# Patient Record
Sex: MALE
[De-identification: names, ages, dates, MRNs, and addresses within clinical notes are randomized per-mention and may not be internally consistent; named-entity substitution may affect disease eponyms.]

---

## 2021-03-27 ENCOUNTER — HOSPITAL ENCOUNTER (EMERGENCY)
Dept: HOSPITAL 97 - ER | Age: 31
Discharge: HOME | End: 2021-03-27
Payer: SELF-PAY

## 2021-03-27 VITALS — SYSTOLIC BLOOD PRESSURE: 136 MMHG | DIASTOLIC BLOOD PRESSURE: 85 MMHG | OXYGEN SATURATION: 98 %
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
## 2021-03-27 DIAGNOSIS — Z23: ICD-10-CM

## 2021-03-27 DIAGNOSIS — S51.811A: Primary | ICD-10-CM

## 2021-03-27 PROCEDURE — 90471 IMMUNIZATION ADMIN: CPT

## 2021-03-27 PROCEDURE — 99284 EMERGENCY DEPT VISIT MOD MDM: CPT

## 2021-03-27 PROCEDURE — 90714 TD VACC NO PRESV 7 YRS+ IM: CPT

## 2021-03-27 PROCEDURE — 0JQG0ZZ REPAIR RIGHT LOWER ARM SUBCUTANEOUS TISSUE AND FASCIA, OPEN APPROACH: ICD-10-PCS

## 2021-03-27 NOTE — EDPHYS
Physician Documentation                                                                           

 [facility]                                                                 

Name: Kamron Villagomez                                                                                 

Age: 30 yrs                                                                                       

Sex: Male                                                                                         

: 1990                                                                                   

MRN: [id number]                                                                                   

Arrival Date: 2021                                                                          

Time: 01:10                                                                                       

Account#: [id number]                                                                            

Bed 3                                                                                             

Private MD:                                                                                       

ED Physician Maite Brady                                                                    

HPI:                                                                                              

                                                                                             

01:34 This 30 yrs old  Male presents to ER via Wheelchair with complaints of          ma2 

      Laceration To Forearm.                                                                      

01:34 The patient or guardian complains of a laceration. Onset: The symptoms/episode          ma2 

      began/occurred suddenly, 1 hour(s) ago. Associated signs and symptoms: Pertinent            

      negatives: erythema, numbness, pain, swelling, tingling, vomiting, warmth, weakness.        

      Severity of symptoms: At their worst the symptoms were moderate, in the emergency           

      department the symptoms are unchanged. The patient has not experienced similar symptoms     

      in the past.                                                                                

                                                                                                  

Historical:                                                                                       

- Home Meds:                                                                                      

01:23 None [Active];                                                                          mg2 

- PMHx:                                                                                           

:23 None;                                                                                   mg2 

- PSHx:                                                                                           

:23 None;                                                                                   mg2 

                                                                                                  

- Immunization history:: Last tetanus immunization: Flu vaccine status is unknown.                

- Social history:: Patient/guardian denies using alcohol, street drugs, The patient               

  lives with family, Smoking status: unknown.                                                     

- Family history:: not pertinent.                                                                 

                                                                                                  

                                                                                                  

ROS:                                                                                              

01:34 Constitutional: Negative for fever, chills, and weight loss.                            ma2 

01:34 All other systems are negative.                                                             

                                                                                                  

Exam:                                                                                             

01:34 Constitutional:  This is a well developed, well nourished patient who is awake, alert,  ma2 

      and in no acute distress. Head/Face:  Normocephalic, atraumatic. Eyes:  Pupils equal        

      round and reactive to light, extra-ocular motions intact.  Lids and lashes normal.          

      Conjunctiva and sclera are non-icteric and not injected.  Cornea within normal limits.      

      Periorbital areas with no swelling, redness, or edema. ENT:  Nares patent. No nasal         

      discharge, no septal abnormalities noted.  Tympanic membranes are normal and external       

      auditory canals are clear.  Oropharynx with no redness, swelling, or masses, exudates,      

      or evidence of obstruction, uvula midline.  Mucous membranes moist. Neck:  Trachea          

      midline, no thyromegaly or masses palpated, and no cervical lymphadenopathy.  Supple,       

      full range of motion without nuchal rigidity, or vertebral point tenderness.  No            

      Meningismus. Chest/axilla:  Normal chest wall appearance and motion.  Nontender with no     

      deformity.  No lesions are appreciated. Cardiovascular:  Regular rate and rhythm with a     

      normal S1 and S2.  No gallops, murmurs, or rubs.  Normal PMI, no JVD.  No pulse             

      deficits. Respiratory:  Lungs have equal breath sounds bilaterally, clear to                

      auscultation and percussion.  No rales, rhonchi or wheezes noted.  No increased work of     

      breathing, no retractions or nasal flaring. Abdomen/GI:  Soft, non-tender, with normal      

      bowel sounds.  No distension or tympany.  No guarding or rebound.  No evidence of           

      tenderness throughout. Skin:  Warm, dry with normal turgor.  Normal color with no           

      rashes, no lesions, and no evidence of cellulitis. MS/ Extremity:  right forearm 5 cm       

      linear laceration, bleeding is controlled, uper exmining the wound ithere is a              

      lacerated nerve, all hand movement and wrist movement is intact. Pulses equal, no           

      cyanosis.  Neurovascular intact.  Full, normal range of motion. Neuro:  Awake and           

      alert, GCS 15, oriented to person, place, time, and situation.  Cranial nerves II-XII       

      grossly intact.  Motor strength 5/5 in all extremities.  Sensory grossly intact.            

      Cerebellar exam normal.  Normal gait.                                                       

                                                                                                  

Vital Signs:                                                                                      

01:20  / 85; Pulse 94; Resp 18; Pulse Ox 98% on R/A;                                    mg2 

                                                                                                  

Erick Coma Score:                                                                               

01:46 Eye Response: spontaneous(4). Verbal Response: oriented(5). Motor Response: obeys       rv  

      commands(6). Total: 15.                                                                     

                                                                                                  

Trauma Score (Adult):                                                                             

01:46 Eye Response: spontaneous(1); Verbal Response: oriented(1); Motor Response: obeys       rv  

      commands(2); Systolic BP: > 89 mm Hg(4); Respiratory Rate: 10 to 29 per min(4); Erick     

      Score: 15; Trauma Score: 12                                                                 

                                                                                                  

Laceration:                                                                                       

01:34 Wound Repair of 4cm ( 1.6in ) subcutaneous laceration to right arm. Linear shaped..     ma2 

      Distal neuro/vascular/tendon intact. Anesthesia: Local anesthetic administered with 10      

      mls of 1% lidocaine w/ Epi. Wound prep: Extensive cleansing, Wound irrigation. Skin         

      closed with 11 1-0 Staples using simple sutures and sterile technique. Dressed with         

      4x4's. Patient tolerated well.                                                              

                                                                                                  

MDM:                                                                                              

01:33 Patient medically screened.                                                             ma2 

01:34 Differential diagnosis: contusion, abrasion, tendonitis, laceration. Data reviewed:     ma2 

      vital signs, nurses notes. Counseling: I had a detailed discussion with the patient         

      and/or guardian regarding: the historical points, exam findings, and any diagnostic         

      results supporting the discharge/admit diagnosis, the presence of at least one elevated     

      blood pressure reading (>120/80) during this emergency department visit, the need for       

      outpatient follow up. Response to treatment: the patient's symptoms have markedly           

      improved after treatment. ED course: patient has right forearm laceration that includes     

      a nerve laceration, although hand movement is intact he may lose muscle strength such       

      as strength of wrist anterior flexion ulnar adduction, vs could be sensation loss, this     

      was not properly assessed as there is pain. he will benefit from hand surgeon, however      

      patient states that he does not anything other than closing the laceration as his hand      

      movement is intact and that all what he cares about. i explained that he may have           

      sensation loss or certain hand movement weakness. he declined any further care, he also     

      declined xray for foreign body evaluation .                                                 

                                                                                                  

Administered Medications:                                                                         

01:37 Drug: Tetanus-Diphtheria Toxoid Adult 0.5 ml {: Retora Black. Exp:         rr5 

      2022. Lot #: [id number]. } Route: IM; Site: right deltoid;                                 

01:51 Follow up: Response: No adverse reaction                                                rv  

                                                                                                  

                                                                                                  

Disposition:                                                                                      

21 01:43 Discharged to Home. Impression: Laceration without foreign body of right           

  forearm.                                                                                        

- Condition is Stable.                                                                            

- Discharge Instructions: Laceration Care, Adult, Easy-to-Read.                                   

                                                                                                  

- Medication Reconciliation Form, Thank You Letter, Antibiotic Education, Prescription            

  Opioid Use form.                                                                                

- Follow up: Private Physician; When: Tomorrow; Reason: If symptoms return.                       

- Notes: remove staples in 2 weeks                                                                

                                                                                                  

                                                                                                  

Signatures:                                                                                       

Maite Brady MD MD   ma2                                                  

Esau Esqueda, PAULA                    RN   mg2                                                  

Kam Christie RN                      RN   rr5                                                  

Silviano Sinclair RN   rv                                                   

                                                                                                  

Corrections: (The following items were deleted from the chart)                                    

01:54 01:43 2021 01:43 Discharged to Home. Impression: Laceration without foreign body  mg2 

      of right forearm. Condition is Stable. Forms are Medication Reconciliation Form, Thank      

      You Letter, Antibiotic Education, Prescription Opioid Use. Follow up: Private               

      Physician; When: Tomorrow; Reason: If symptoms return. ma2                                  

                                                                                                  

**************************************************************************************************
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
## 2023-02-13 ENCOUNTER — HOSPITAL ENCOUNTER (EMERGENCY)
Dept: HOSPITAL 97 - ER | Age: 33
Discharge: HOME | End: 2023-02-13
Payer: COMMERCIAL

## 2023-02-13 VITALS — DIASTOLIC BLOOD PRESSURE: 92 MMHG | OXYGEN SATURATION: 99 % | SYSTOLIC BLOOD PRESSURE: 145 MMHG

## 2023-02-13 VITALS — TEMPERATURE: 98.6 F

## 2023-02-13 DIAGNOSIS — Z88.8: ICD-10-CM

## 2023-02-13 DIAGNOSIS — Z88.6: ICD-10-CM

## 2023-02-13 DIAGNOSIS — S29.012A: ICD-10-CM

## 2023-02-13 DIAGNOSIS — S16.1XXA: Primary | ICD-10-CM

## 2023-02-13 PROCEDURE — 71046 X-RAY EXAM CHEST 2 VIEWS: CPT

## 2023-02-13 PROCEDURE — 72040 X-RAY EXAM NECK SPINE 2-3 VW: CPT

## 2023-02-13 PROCEDURE — 96372 THER/PROPH/DIAG INJ SC/IM: CPT

## 2023-02-13 PROCEDURE — 99283 EMERGENCY DEPT VISIT LOW MDM: CPT

## 2023-02-13 PROCEDURE — 93005 ELECTROCARDIOGRAM TRACING: CPT

## 2023-02-13 NOTE — RAD REPORT
EXAM DESCRIPTION:  RAD - C Spine Ap/Lat - 2/13/2023 8:44 pm

 

CLINICAL HISTORY:  Pain

 

COMPARISON:  None.

 

FINDINGS:  Cervical vertebra bodies are normal in height and alignment. No fracture or acute bony pro
cess seen. No disc space narrowing.

 

There is no prevertebral soft tissue thickening or other suspicious soft tissue finding.

 

 

IMPRESSION:  Normal cervical spine radiographs.

## 2023-02-13 NOTE — RAD REPORT
[facility] DES[facility]  [facility] 8:44 pm

 

CLINICAL HISTORY:  CHEST PAIN

 

COMPARISON:  No comparisons

 

TECHNIQUE:   PA and lateral views of the chest.

 

FINDINGS:  The lungs are clear. No pneumothorax or effusion. The cardiomediastinal contours are unrem
arkable.

 

IMPRESSION:  No acute cardiopulmonary process.

## 2023-02-13 NOTE — EDPHYS
Physician Documentation                                                                           

 [facility]                                                                 

Name: Kamron Villagomez                                                                                 

Age: 32 yrs                                                                                       

Sex: Male                                                                                         

: 1990                                                                                   

MRN: [id number]                                                                                   

Arrival Date: 2023                                                                          

Time: 18:30                                                                                       

Account#: [id number]                                                                            

Bed 11                                                                                            

Private MD:                                                                                       

ED Physician Tomi Capone                                                                      

HPI:                                                                                              

                                                                                             

22:07 This 32 yrs old  Male presents to ER via Ambulatory with complaints of Neck and minerva 

      Upper Back Pain.                                                                            

22:07 The patient or guardian complains of decreased range of motion, pain, that is acute.    minerva 

      The symptoms are located on the posterior cervical area, left trapezius, right              

      trapezius and thoracic area. Onset: The symptoms/episode began/occurred 2 day(s) ago.       

      Context: The problem was sustained at home, The neck injury/problem resulted from           

      lifting. Associated signs and symptoms: Pertinent positives:. The pain does not             

      radiate. Severity of symptoms: At their worst the symptoms were mild, moderate, in the      

      emergency department the symptoms are unchanged. The patient has not experienced            

      similar symptoms in the past.                                                               

                                                                                                  

Historical:                                                                                       

- Allergies:                                                                                      

19:32 Benadryl;                                                                               jh5 

19:32 Tylenol;                                                                                jh5 

                                                                                                  

- Immunization history:: Adult Immunizations up to date.                                          

- Social history:: Smoking status: Patient denies any tobacco usage or history of.                

- Family history:: not pertinent.                                                                 

                                                                                                  

                                                                                                  

ROS:                                                                                              

22:07 Constitutional: Negative for fever, chills, and weight loss, Eyes: Negative for injury, minerva 

      pain, redness, and discharge, ENT: Negative for injury, pain, and discharge, Neck:          

      Negative for injury, pain, and swelling, Cardiovascular: Negative for chest pain,           

      palpitations, and edema, Respiratory: Negative for shortness of breath, cough,              

      wheezing, and pleuritic chest pain, Abdomen/GI: Negative for abdominal pain, nausea,        

      vomiting, diarrhea, and constipation, Back: Negative for injury and pain, : Negative      

      for injury, bleeding, discharge, and swelling, Skin: Negative for injury, rash, and         

      discoloration, Neuro: Negative for headache, weakness, numbness, tingling, and seizure,     

      Psych: Negative for depression, anxiety, suicide ideation, homicidal ideation, and          

      hallucinations, Allergy/Immunology: Negative for hives, rash, and allergies, Endocrine:     

      Negative for neck swelling, polydipsia, polyuria, polyphagia, and marked weight changes.    

22:07 MS/extremity: Positive for decreased range of motion, pain, swelling, tenderness, of        

      the back.                                                                                   

                                                                                                  

Exam:                                                                                             

22:07 Constitutional:  This is a well developed, well nourished patient who is awake, alert,  minerva 

      and in no acute distress. Head/Face:  Normocephalic, atraumatic. Eyes:  Pupils equal        

      round and reactive to light, extra-ocular motions intact.  Lids and lashes normal.          

      Conjunctiva and sclera are non-icteric and not injected.  Cornea within normal limits.      

      Periorbital areas with no swelling, redness, or edema. ENT:  Nares patent. No nasal         

      discharge, no septal abnormalities noted.  Tympanic membranes are normal and external       

      auditory canals are clear.  Oropharynx with no redness, swelling, or masses, exudates,      

      or evidence of obstruction, uvula midline.  Mucous membranes moist. Neck:  Trachea          

      midline, no thyromegaly or masses palpated, and no cervical lymphadenopathy.  Supple,       

      full range of motion without nuchal rigidity, or vertebral point tenderness.  No            

      Meningismus. Chest/axilla:  Normal chest wall appearance and motion.  Nontender with no     

      deformity.  No lesions are appreciated. Cardiovascular:  Regular rate and rhythm with a     

      normal S1 and S2.  No gallops, murmurs, or rubs.  Normal PMI, no JVD.  No pulse             

      deficits. Respiratory:  Lungs have equal breath sounds bilaterally, clear to                

      auscultation and percussion.  No rales, rhonchi or wheezes noted.  No increased work of     

      breathing, no retractions or nasal flaring. Abdomen/GI:  Soft, non-tender, with normal      

      bowel sounds.  No distension or tympany.  No guarding or rebound.  No evidence of           

      tenderness throughout. Back:  No spinal tenderness.  No costovertebral tenderness.          

      Full range of motion. Male :  Normal genitalia with no discharge or lesions. Skin:        

      Warm, dry with normal turgor.  Normal color with no rashes, no lesions, and no evidence     

      of cellulitis. MS/ Extremity:  Pulses equal, no cyanosis.  Neurovascular intact.  Full,     

      normal range of motion. Neuro:  Awake and alert, GCS 15, oriented to person, place,         

      time, and situation.  Cranial nerves II-XII grossly intact.  Motor strength 5/5 in all      

      extremities.  Sensory grossly intact.  Cerebellar exam normal.  Normal gait. Psych:         

      Awake, alert, with orientation to person, place and time.  Behavior, mood, and affect       

      are within normal limits.                                                                   

22:36 ECG was reviewed by the Attending Physician.                                            [facility] 

                                                                                                  

Vital Signs:                                                                                      

19:30  / 89; Pulse 84; Resp 18; Temp 98.6; Pulse Ox 100% ; Weight 102.06 kg; Height 6   [facility] 

      ft. 0 in. (182.88 cm); Pain 8/10;                                                           

22:22  / 92; Pulse 78; Resp 16; Pulse Ox 99% ;                                          mb9 

19:30 Body Mass Index 30.52 (102.06 kg, 182.88 cm)                                            [facility] 

                                                                                                  

MDM:                                                                                              

19:34 Patient medically screened.                                                             minerva 

22:11 Differential diagnosis: arthritis, Cervical Disc Herniation Cervical Raiculopathy       minerva 

      cervical strain, Degenerative Disc Disease Neck Contusion Osteoarthritis Spinal Cord        

      Compression Spondylolisthesis torticollis. Data reviewed: vital signs, nurses notes,        

      lab test result(s), EKG, radiologic studies, plain films. Consideration of                  

      Admission/Observation Escalation of care including admission/observation considered.        

      Test considered but Not performed: CT: no ct c spine no ct chest. Historians other than     

      the Patient: Spouse/Significant Other: wife. Care significantly affected by the             

      following chronic conditions: none.                                                         

                                                                                                  

                                                                                             

19:38 Order name: Chest Pa And Lat (2 Views) XRAY                                             [facility] 

                                                                                             

19:38 Order name: C Spine Ap/Lat XRAY                                                         [facility] 

                                                                                             

21:06 Order name: RAD; Complete Time: 21:30                                                   [facility]

                                                                                             

21:06 Order name: RAD; Complete Time: 21:30                                                   [facility]

                                                                                             

19:47 Order name: EKG; Complete Time: 19:47                                                   [facility] 

                                                                                             

22:21 Order name: EKG; Complete Time: 22:21                                                   mb9 

                                                                                                  

EC:36 Rate is 54 beats/min. Rhythm is regular. QRS Axis is Normal. VA interval is normal. QRS minerva 

      interval is normal. QT interval is normal. No Q waves. T waves are Normal. Clinical         

      impression: Sinus bradycardia. Interpreted by me. Reviewed by me.                           

                                                                                                  

Administered Medications:                                                                         

20:31 Drug: TORadol (ketorolac) 60 mg Route: IM; Site: right vastus lateralis;                kl  

22:07 CANCELLED (Duplicate Order): Norco (HYDROcodone-acetaminophen) 10 mg-325 mg 1 tabs PO   minerva 

      once                                                                                        

22:21 Drug: Valium (diazepam) 10 mg Route: PO;                                                mb9 

22:25 Follow up: Response: No adverse reaction                                                mb9 

                                                                                                  

                                                                                                  

Disposition Summary:                                                                              

23 22:10                                                                                    

Discharge Ordered                                                                                 

      Location: Home                                                                          [facility] 

      Problem: new                                                                            minerva 

      Symptoms: have improved                                                                 minerva 

      Condition: Stable                                                                       minerva 

      Diagnosis                                                                                   

        - Strain of muscle and tendon of unspecified wall of thorax                           minerva 

        - Strain of muscle, fascia and tendon at neck level, initial encounter                minerva 

      Followup:                                                                               minerva 

        - With: Private Physician                                                                  

        - When: 2 - 3 days                                                                         

        - Reason: Recheck today's complaints, Continuance of care, Re-evaluation by your           

      physician                                                                                   

      Discharge Instructions:                                                                     

        - Discharge Summary Sheet                                                             minerva 

        - Muscle Strain                                                                       mienrva 

        - Neck Contusion                                                                      minerva 

        - Muscle Strain, Easy-to-Read                                                         minerva 

        - Neck Contusion, Easy-to-Read                                                        minerva 

      Forms:                                                                                      

        - Medication Reconciliation Form                                                      [facility] 

        - Thank You Letter                                                                    minerva 

        - Antibiotic Education                                                                minerva 

        - Prescription Opioid Use                                                             [facility] 

        - Work release form                                                                   mb9 

      Prescriptions:                                                                              

        - Ibuprofen 600 mg Oral Tablet                                                             

            - take 1 tablet by ORAL route every 6 hours As needed take with food; 30 tablet;  [facility] 

      Refills: 0, Product Selection Permitted                                                     

        - Medrol (Shankar) 4 mg Oral Tablets, Dose Pack                                                

            - take 1 tablet by ORAL route as directed - follow package instructions; 1        minerva 

      packet; Refills: 0, Product Selection Permitted                                             

        - Cyclobenzaprine 5 mg Oral Tablet                                                         

            - take 1 tablet by ORAL route 3 times per day As needed; 15 tablet; Refills: 0,   [facility] 

      Product Selection Permitted                                                                 

Signatures:                                                                                       

Dispatcher [employer]                           Lisette Castillo, RN                     Tomi Jett MD MD cha Rees, Jessica RN                       RN   jh5                                                  

Ariane Castellanos RN                 RN   mb9                                                  

                                                                                                  

Corrections: (The following items were deleted from the chart)                                    

22:07 22:07 Norco (HYDROcodone-acetaminophen) 10 mg-325 mg 1 tabs PO once ordered. [facility] 

22:17 19:47 EKG - Nurse/Tech ordered. [facility] 

                                                                                                  

**************************************************************************************************

## 2023-02-13 NOTE — ER
Nurse's Notes                                                                                     

 Formerly [facility]                                                                 

Name: Kamron Villagomez                                                                                 

Age: 32 yrs                                                                                       

Sex: Male                                                                                         

: 1990                                                                                   

MRN: [id number]                                                                                   

Arrival Date: 2023                                                                          

Time: 18:30                                                                                       

Account#: [id number]                                                                            

Bed 11                                                                                            

Private MD:                                                                                       

Diagnosis: Strain of muscle and tendon of unspecified wall of thorax;Strain of muscle, fascia and 

  tendon at neck level, initial encounter                                                         

                                                                                                  

Presentation:                                                                                     

                                                                                             

19:30 Chief complaint: Patient states: i think i pulled something in my upper back; I was at  [facility] 

      the gym doing  press yesterday. Coronavirus screen: Vaccine status: Patient         

      reports receiving the 2nd dose of the covid vaccine. Client denies travel out of the        

      [place] in the last 14 days. Ebola Screen: Patient negative for fever greater than or          

      equal to 101.5 degrees Fahrenheit, and additional compatible Ebola Virus Disease            

      symptoms Patient denies exposure to infectious person. Patient denies travel to an          

      Ebola-affected area in the 21 days before illness onset. Initial Sepsis Screen: Does        

      the patient meet any 2 criteria? No. Patient's initial sepsis screen is negative. Does      

      the patient have a suspected source of infection? No. Patient's initial sepsis screen       

      is negative. Risk Assessment: Do you want to hurt yourself or someone else? Patient         

      reports no desire to harm self or others.                                                   

19:30 Method Of Arrival: Ambulatory                                                           [facility] 

19:30 Acuity: SUE 3                                                                           [facility] 

                                                                                                  

Triage Assessment:                                                                                

19:32 General: Appears uncomfortable, slender, well groomed, well developed, well nourished,  [facility] 

      Behavior is calm, cooperative, appropriate for age. Pain: Complains of pain in upper        

      back.                                                                                       

                                                                                                  

Historical:                                                                                       

- Allergies:                                                                                      

19:32 Benadryl;                                                                               5 

19:32 Tylenol;                                                                                [facility] 

                                                                                                  

- Immunization history:: Adult Immunizations up to date.                                          

- Social history:: Smoking status: Patient denies any tobacco usage or history of.                

- Family history:: not pertinent.                                                                 

                                                                                                  

                                                                                                  

Screenin:00 [facility] ED Fall Risk Assessment (Adult) History of falling in the last 3 months,       mb9 

      including since admission No falls in past 3 months (0 pts) Confusion or Disorientation     

      No (0 pts) Intoxicated or Sedated No (0 pts) Impaired Gait No (0 pts) Mobility Assist       

      Device Used No (0 pt) Altered Elimination No (0 pt) Score/Fall Risk Level 0 - 2 = Low       

      Risk Oriented to surroundings, Maintained a safe environment, Educated pt \T\ family on     

      fall prevention, incl call for assistance when getting out of bed. Abuse screen: Denies     

      threats or abuse. Nutritional screening: No deficits noted. Tuberculosis screening: No      

      symptoms or risk factors identified.                                                        

                                                                                                  

Assessment:                                                                                       

22:20 Reassessment: No changes from previously documented assessment. Patient and/or family   mb9 

      updated on plan of care and expected duration. Pain level reassessed. Patient is alert,     

      oriented x 3, equal unlabored respirations, skin warm/dry/pink. Patient states feeling      

      better. Patient states symptoms have improved.                                              

                                                                                                  

Vital Signs:                                                                                      

19:30  / 89; Pulse 84; Resp 18; Temp 98.6; Pulse Ox 100% ; Weight 102.06 kg; Height 6   5 

      ft. 0 in. (182.88 cm); Pain 8/10;                                                           

22:22  / 92; Pulse 78; Resp 16; Pulse Ox 99% ;                                          mb9 

19:30 Body Mass Index 30.52 (102.06 kg, 182.88 cm)                                            [facility] 

                                                                                                  

ED Course:                                                                                        

18:30 Patient arrived in ED.                                                                  as  

19:32 Triage completed.                                                                       [facility] 

19:32 Arm band placed on right wrist.                                                         [facility] 

19:34 Tomi Capone MD is Attending Physician.                                             [facility] 

19:45 Placed in gown. Bed in low position. Call light in reach. Side rails up X 1. Client     mb9 

      placed on continuous cardiac and pulse oximetry monitoring. NIBP monitoring applied.        

22:22 No provider procedures requiring assistance completed. EKG done, by EKG tech. reviewed  mb9 

      by Tomi Capone MD. Patient did not have IV access during this emergency room visit.      

                                                                                                  

Administered Medications:                                                                         

20:31 Drug: TORadol (ketorolac) 60 mg Route: IM; Site: right vastus lateralis;                kl  

22:07 CANCELLED (Duplicate Order): Norco (HYDROcodone-acetaminophen) 10 mg-325 mg 1 tabs PO   [facility] 

      once                                                                                        

22:21 Drug: Valium (diazepam) 10 mg Route: PO;                                                mb9 

22:25 Follow up: Response: No adverse reaction                                                mb9 

                                                                                                  

                                                                                                  

Medication:                                                                                       

22:22 VIS not applicable for this client.                                                     mb9 

                                                                                                  

Outcome:                                                                                          

22:10 Discharge ordered by MD.                                                                [facility] 

22:23 Discharged to home ambulatory.                                                          mb9 

22:23 Condition: stable                                                                           

22:23 Discharge instructions given to patient, Instructed on discharge instructions, follow       

      up and referral plans. Demonstrated understanding of instructions, follow-up care,          

      medications, Prescriptions given X 3.                                                       

22:25 Patient left the ED.                                                                    mb9 

22:37 Patient left the ED.                                                                    mb9 

                                                                                                  

Signatures:                                                                                       

Lisette Davila, RN                     Tomi Jett MD MD cha Martinez, Amelia as Rees, Jessica, RN                       RN   jh5                                                  

Ariane Castellanos RN                 RN   mb9                                                  

                                                                                                  

Corrections: (The following items were deleted from the chart)                                    

19:33 19:30 Pulse 84bpm; Resp 18bpm; Pulse Ox 100%; Temp 98.6F; 102.06 kg; Height 6 ft. 0     jh5 

      in.; BMI: 30.5; Pain 8/10; jh5                                                              

                                                                                                  

**************************************************************************************************

## 2023-02-14 NOTE — EKG
Test Date:    2023-02-13               Test Time:    22:18:41

Technician:   RV                                     

                                                     

MEASUREMENT RESULTS:                                       

Intervals:                                           

Rate:         54                                     

CT:           158                                    

QRSD:         94                                     

QT:           438                                    

QTc:          415                                    

Axis:                                                

P:            53                                     

CT:           158                                    

QRS:          73                                     

T:            45                                     

                                                     

INTERPRETIVE STATEMENTS:                                       

                                                     

Sinus bradycardia with marked sinus arrhythmia

Early repolarization

Otherwise normal ECG

No previous ECG available for comparison



Electronically Signed On 02-14-23 17:08:34 CST by Tuan Mulligan